# Patient Record
Sex: FEMALE | Race: WHITE | NOT HISPANIC OR LATINO | Employment: FULL TIME | URBAN - METROPOLITAN AREA
[De-identification: names, ages, dates, MRNs, and addresses within clinical notes are randomized per-mention and may not be internally consistent; named-entity substitution may affect disease eponyms.]

---

## 2023-11-01 PROCEDURE — 99283 EMERGENCY DEPT VISIT LOW MDM: CPT

## 2023-11-01 PROCEDURE — 96372 THER/PROPH/DIAG INJ SC/IM: CPT

## 2023-11-01 RX ORDER — ACETAMINOPHEN 500 MG
1000 TABLET ORAL ONCE
Status: COMPLETED | OUTPATIENT
Start: 2023-11-01 | End: 2023-11-02

## 2023-11-01 RX ORDER — KETOROLAC TROMETHAMINE 30 MG/ML
30 INJECTION, SOLUTION INTRAMUSCULAR; INTRAVENOUS ONCE
Status: COMPLETED | OUTPATIENT
Start: 2023-11-01 | End: 2023-11-02

## 2023-11-01 RX ORDER — LIDOCAINE 4 G/G
1 PATCH TOPICAL
Status: DISCONTINUED | OUTPATIENT
Start: 2023-11-01 | End: 2023-11-02 | Stop reason: HOSPADM

## 2023-11-02 ENCOUNTER — HOSPITAL ENCOUNTER (EMERGENCY)
Facility: HOSPITAL | Age: 58
Discharge: HOME | End: 2023-11-02
Attending: EMERGENCY MEDICINE
Payer: COMMERCIAL

## 2023-11-02 VITALS
OXYGEN SATURATION: 97 % | SYSTOLIC BLOOD PRESSURE: 131 MMHG | DIASTOLIC BLOOD PRESSURE: 90 MMHG | RESPIRATION RATE: 20 BRPM | HEART RATE: 99 BPM | BODY MASS INDEX: 20.77 KG/M2 | HEIGHT: 61 IN | WEIGHT: 110 LBS | TEMPERATURE: 98.1 F

## 2023-11-02 DIAGNOSIS — M54.42 ACUTE LEFT-SIDED LOW BACK PAIN WITH LEFT-SIDED SCIATICA: Primary | ICD-10-CM

## 2023-11-02 PROCEDURE — 25010000002 KETOROLAC TROMETHAMINE PER 15 MG: Performed by: EMERGENCY MEDICINE

## 2023-11-02 PROCEDURE — 96372 THER/PROPH/DIAG INJ SC/IM: CPT

## 2023-11-02 RX ORDER — CYCLOBENZAPRINE HCL 10 MG
10 TABLET ORAL ONCE
Status: COMPLETED | OUTPATIENT
Start: 2023-11-02 | End: 2023-11-02

## 2023-11-02 RX ORDER — CYCLOBENZAPRINE HCL 10 MG
10 TABLET ORAL 3 TIMES DAILY
Qty: 9 TABLET | Refills: 0 | Status: SHIPPED | OUTPATIENT
Start: 2023-11-02 | End: 2023-11-05

## 2023-11-02 RX ORDER — LIDOCAINE 50 MG/G
1 PATCH TOPICAL EVERY 24 HOURS
Qty: 7 PATCH | Refills: 0 | Status: SHIPPED | OUTPATIENT
Start: 2023-11-02 | End: 2023-11-09

## 2023-11-02 RX ORDER — OXYCODONE HYDROCHLORIDE 5 MG/1
5 TABLET ORAL ONCE
Status: COMPLETED | OUTPATIENT
Start: 2023-11-02 | End: 2023-11-02

## 2023-11-02 RX ADMIN — ACETAMINOPHEN 1000 MG: 500 TABLET ORAL at 00:40

## 2023-11-02 RX ADMIN — KETOROLAC TROMETHAMINE 30 MG: 30 INJECTION, SOLUTION INTRAMUSCULAR; INTRAVENOUS at 00:45

## 2023-11-02 RX ADMIN — CYCLOBENZAPRINE 10 MG: 10 TABLET, FILM COATED ORAL at 01:15

## 2023-11-02 RX ADMIN — LIDOCAINE 1 PATCH: 4 PATCH TOPICAL at 00:40

## 2023-11-02 RX ADMIN — OXYCODONE HYDROCHLORIDE 5 MG: 5 TABLET ORAL at 01:15

## 2023-11-02 NOTE — DISCHARGE INSTRUCTIONS
I recommend you take Tylenol 650 mg every 6 hours and ibuprofen 400 mg every 6 hours as needed for pain unless you have a contraindication to these medications  Take prescribed cyclobenzaprine as needed for muscle spasms.  Apply medicated lidocaine patches for additional relief.  Return to the ER as needed for new or worsening symptoms

## 2023-11-02 NOTE — ED PROVIDER NOTES
Subjective   History of Present Illness  Patient presents for evaluation of acute left low back pain that radiates down the left leg.  Patient has a history of sciatica and states it feels similar.  She states she has had this episode of back pain for couple weeks but has not been excruciating until the past 24 hours when she had abrupt worsening and the radiation of the pain down the left leg.  No episodes of bowel or bladder incontinence, no fever.  No history of IV drug use or malignancy.  No weakness or numbness in the legs    History provided by:  Patient      Review of Systems    History reviewed. No pertinent past medical history.    No Known Allergies    History reviewed. No pertinent surgical history.    History reviewed. No pertinent family history.    Social History     Socioeconomic History    Marital status:            Objective   Physical Exam  Constitutional:       General: She is not in acute distress.  HENT:      Head: Normocephalic and atraumatic.   Eyes:      Conjunctiva/sclera: Conjunctivae normal.      Pupils: Pupils are equal, round, and reactive to light.   Cardiovascular:      Rate and Rhythm: Normal rate and regular rhythm.      Pulses: Normal pulses.      Heart sounds: No murmur heard.     No gallop.   Pulmonary:      Effort: Pulmonary effort is normal. No respiratory distress.   Abdominal:      General: Abdomen is flat. There is no distension.      Tenderness: There is no abdominal tenderness.   Musculoskeletal:         General: No swelling or deformity. Normal range of motion.   Skin:     General: Skin is warm and dry.      Capillary Refill: Capillary refill takes less than 2 seconds.   Neurological:      General: No focal deficit present.      Mental Status: She is alert and oriented to person, place, and time.      Comments: 5 out of 5 strength in the bilateral lower extremities.  Sensation to light touch intact   Psychiatric:         Mood and Affect: Mood normal.          "Behavior: Behavior normal.         Procedures           ED Course                                           Medical Decision Making  Signs and symptoms are most consistent with an acute low back strain.  Emergent pathology including cauda equina syndrome, spinal epidural abscess, abdominal aortic aneurysm are considered but felt to be less likely.  At this time patient does not have red flag symptoms to warrant emergent MRI.  Will attempt pain control with 1 g p.o. Tylenol, 30 mg intramuscular Toradol, topical lidocaine patch and reassess.    After multiple rounds of medication patient does achieve adequate analgesia.  She will follow-up with her primary doctor.  She was discharged from the ER in good condition    Problems Addressed:  Acute left-sided low back pain with left-sided sciatica: complicated acute illness or injury    Risk  OTC drugs.  Prescription drug management.        Final diagnoses:   Acute left-sided low back pain with left-sided sciatica       ED Disposition  ED Disposition       ED Disposition   Discharge    Condition   Stable    Comment   --           No results found for this or any previous visit (from the past 24 hour(s)).  Note: In addition to lab results from this visit, the labs listed above may include labs taken at another facility or during a different encounter within the last 24 hours. Please correlate lab times with ED admission and discharge times for further clarification of the services performed during this visit.    No orders to display     Vitals:    11/01/23 2241 11/01/23 2242 11/02/23 0147   BP:  (!) 122/102 131/90   BP Location:   Left arm   Patient Position:   Standing   Pulse:  110 99   Resp:  18 20   Temp:  98.1 °F (36.7 °C)    TempSrc:  Oral    SpO2:  96% 97%   Weight: 49.9 kg (110 lb) 49.9 kg (110 lb)    Height: 154.9 cm (61\") 154.9 cm (61\")      Medications   acetaminophen (TYLENOL) tablet 1,000 mg (1,000 mg Oral Given 11/2/23 0040)   ketorolac (TORADOL) injection 30 " mg (30 mg Intramuscular Given 11/2/23 0045)   cyclobenzaprine (FLEXERIL) tablet 10 mg (10 mg Oral Given 11/2/23 0115)   oxyCODONE (ROXICODONE) immediate release tablet 5 mg (5 mg Oral Given 11/2/23 0115)     ECG/EMG Results (last 24 hours)       ** No results found for the last 24 hours. **          No orders to display           Bhargavi Nieto MD  [id number] [facility] [address] [phone]               Medication List        New Prescriptions      cyclobenzaprine 10 MG tablet  Commonly known as: FLEXERIL  Take 1 tablet by mouth 3 (Three) Times a Day for 3 days.     lidocaine 5 %  Commonly known as: LIDODERM  Place 1 patch on the skin as directed by provider Daily for 7 days. Remove & Discard patch within 12 hours or as directed by MD               Where to Get Your Medications        These medications were sent to [facility] [id number] - [place], [place] - [address] [phone] [facility] [phone] FX  [address] [facility] [address]      Phone: [phone]   cyclobenzaprine 10 MG tablet  lidocaine 5 %            Dylan Cardona MD  11/02/23 2650